# Patient Record
Sex: MALE | Race: WHITE | URBAN - METROPOLITAN AREA
[De-identification: names, ages, dates, MRNs, and addresses within clinical notes are randomized per-mention and may not be internally consistent; named-entity substitution may affect disease eponyms.]

---

## 2021-01-01 ENCOUNTER — OFFICE VISIT (OUTPATIENT)
Dept: URBAN - METROPOLITAN AREA CLINIC 51 | Facility: CLINIC | Age: 31
End: 2021-01-01
Payer: COMMERCIAL

## 2021-01-01 DIAGNOSIS — H40.023 OPEN ANGLE WITH BORDERLINE FINDINGS, HIGH RISK, BILATERAL: Primary | ICD-10-CM

## 2021-01-01 DIAGNOSIS — H50.112 MONOCULAR EXOTROPIA, LEFT EYE: ICD-10-CM

## 2021-01-01 PROCEDURE — 99214 OFFICE O/P EST MOD 30 MIN: CPT | Performed by: OPTOMETRIST

## 2021-01-01 PROCEDURE — 92133 CPTRZD OPH DX IMG PST SGM ON: CPT | Performed by: OPTOMETRIST

## 2021-01-01 PROCEDURE — 99213 OFFICE O/P EST LOW 20 MIN: CPT | Performed by: OPTOMETRIST

## 2021-01-01 PROCEDURE — 76514 ECHO EXAM OF EYE THICKNESS: CPT | Performed by: OPTOMETRIST

## 2021-01-01 PROCEDURE — 92083 EXTENDED VISUAL FIELD XM: CPT | Performed by: OPTOMETRIST

## 2021-01-01 PROCEDURE — 99204 OFFICE O/P NEW MOD 45 MIN: CPT | Performed by: OPTOMETRIST

## 2021-01-01 RX ORDER — LATANOPROST 50 UG/ML
0.005 % SOLUTION OPHTHALMIC
Qty: 7.5 | Refills: 2 | Status: INACTIVE
Start: 2021-01-01 | End: 2022-01-01

## 2021-01-01 ASSESSMENT — KERATOMETRY
OS: 43.48
OD: 43.27

## 2021-01-01 ASSESSMENT — INTRAOCULAR PRESSURE
OD: 24
OS: 28
OS: 25
OS: 26
OD: 20
OD: 25

## 2021-01-01 ASSESSMENT — VISUAL ACUITY
OD: 20/20
OS: 20/20

## 2021-05-20 NOTE — IMPRESSION/PLAN
Impression: Open angle with borderline findings, high risk, bilateral: H40.023. Plan: high myopia 
rnfl abnormal OU 
no family history of glc iop unable with watts pt extememely uncooperative 
attempted tonopen - extreme squeeze 38/26 (unreliable results) 

schedule HVF / GCA/PACHS/ oleg pen

## 2021-05-20 NOTE — IMPRESSION/PLAN
Impression: Monocular exotropia, left eye: H50.112.  Plan: large angle pt states occurred 8 years ago , unknown cause 
was intermittent now constant 
refer to strab specialist for eval

## 2021-06-30 NOTE — IMPRESSION/PLAN
Impression: Open angle with borderline findings, high risk, bilateral: H40.023. thick PACHS OD unable OS 
high myopia 
rnfl abnormal OU , gca normal 
no family history of glc Plan: last tonopen - extreme squeeze 38/26 (unreliable results) Miguel Robles today 24/25( difficult)  Squeezing Start Latanoprost QHS OU 
RTC 3mos IOP check

## 2021-09-30 NOTE — IMPRESSION/PLAN
Impression: Monocular exotropia, left eye: H50.112.  Plan: wishes referral for VT to control drifting eye 
was unsuccessful with surgeon due to insurance

## 2021-09-30 NOTE — IMPRESSION/PLAN
Impression: Open angle with borderline findings, high risk, bilateral: H40.023. thick PACHS OD unable OS 
high myopia 
rnfl abnormal OU , gca normal 
no family history of glc Plan: last tonopen - extreme squeeze 38/26 (unreliable results) Fatoumata Rides unable today noncompliance squeezing
tonopen very difficult 2 people to open lids  Squeezing 
continue  Latanoprost QHS OU 
3 mos IOP check

## 2021-12-29 NOTE — IMPRESSION/PLAN
Impression: Open angle with borderline findings, high risk, bilateral: H40.023. thick PACHS OD unable OS 
high myopia 
rnfl abnormal OU , gca normal 
no family history of glc Plan: last tonopen - extreme squeeze  (unreliable results) Chandra Clayton unable today noncompliance squeezing
will use rnfl / gca and HVF to test for progression
continue  Latanoprost QHS OU 
3 mos VF / rnfl GCA dilate 
use tonopen only